# Patient Record
Sex: FEMALE | Race: WHITE
[De-identification: names, ages, dates, MRNs, and addresses within clinical notes are randomized per-mention and may not be internally consistent; named-entity substitution may affect disease eponyms.]

---

## 2020-07-29 ENCOUNTER — HOSPITAL ENCOUNTER (INPATIENT)
Dept: HOSPITAL 56 - MW.OBCHECK | Age: 30
LOS: 1 days | Discharge: HOME | End: 2020-07-30
Attending: OBSTETRICS & GYNECOLOGY | Admitting: OBSTETRICS & GYNECOLOGY
Payer: COMMERCIAL

## 2020-07-29 DIAGNOSIS — Z3A.39: ICD-10-CM

## 2020-07-29 LAB
BUN SERPL-MCNC: 10 MG/DL (ref 7–18)
CHLORIDE SERPL-SCNC: 103 MMOL/L (ref 98–107)
CO2 SERPL-SCNC: 19.7 MMOL/L (ref 21–32)
GLUCOSE SERPL-MCNC: 84 MG/DL (ref 74–106)
POTASSIUM SERPL-SCNC: 3.9 MMOL/L (ref 3.5–5.1)
SODIUM SERPL-SCNC: 137 MMOL/L (ref 136–145)

## 2020-07-29 PROCEDURE — U0002 COVID-19 LAB TEST NON-CDC: HCPCS

## 2020-07-29 NOTE — PCM.DEL
L & D Note





- General Info


Date of Service: 20


Mother's Due Date: 20





- Delivery Note


Labor: Spontaneous


Delivery Outcome: Livebirth


Infant Delivery Method: Spontaneous Vaginal Delivery-Single


Birth Presentation: Left Occiput Anterior (CEDRIC)


Nuchal Cord: None


Prep: Other


Anesthesia Type: None


Episiotomy Type: None


Laceration: None


Placenta: Intact, Spontaneous


Cord: 3 Vessels


Estimated Blood Loss: 200


Resuscitation Needed: No


: Suctioned


APGAR Score 1 min: 8


APGAR Score 5 min: 9


Delivery Comments (Free Text/Narrative):: 





Liveborn male, weight pending.





- General Info


Date of Service: 20





- Patient Data


Weight - Most Recent: 87.543 kg


Lab Results Last 24 Hours: 


                         Laboratory Results - last 24 hr











  20 Range/Units





  01:28 01:28 01:28 


 


WBC  11.30 H    (4.0-11.0)  K/uL


 


RBC  4.14 L    (4.30-5.90)  M/uL


 


Hgb  11.3 L    (12.0-16.0)  g/dL


 


Hct  35.1 L    (36.0-46.0)  %


 


MCV  84.8    (80.0-98.0)  fL


 


MCH  27.3    (27.0-32.0)  pg


 


MCHC  32.2    (31.0-37.0)  g/dL


 


RDW Std Deviation  39.4    (28.0-62.0)  fl


 


RDW Coeff of Burke  13    (11.0-15.0)  %


 


Plt Count  197    (150-400)  K/uL


 


MPV  12.00    (7.40-12.00)  fL


 


Nucleated RBC %  0.0    /100WBC


 


Nucleated RBCs #  0    K/uL


 


Sodium    137  (136-145)  mmol/L


 


Potassium    3.9  (3.5-5.1)  mmol/L


 


Chloride    103  ()  mmol/L


 


Carbon Dioxide    19.7 L  (21.0-32.0)  mmol/L


 


BUN    10  (7.0-18.0)  mg/dL


 


Creatinine    0.9  (0.6-1.0)  mg/dL


 


Est Cr Clr Drug Dosing    89.69  mL/min


 


Estimated GFR (MDRD)    > 60.0  ml/min


 


Glucose    84  ()  mg/dL


 


Calcium    8.8  (8.5-10.1)  mg/dL


 


Total Bilirubin    0.3  (0.2-1.0)  mg/dL


 


AST    26  (15-37)  IU/L


 


ALT    18  (14-63)  IU/L


 


Alkaline Phosphatase    182 H  ()  U/L


 


Total Protein    6.3 L  (6.4-8.2)  g/dL


 


Albumin    2.9 L  (3.4-5.0)  g/dL


 


Globulin    3.4  (2.6-4.0)  g/dL


 


Albumin/Globulin Ratio    0.9  (0.9-1.6)  


 


Urine Color     


 


Urine Appearance     


 


Urine pH     (5.0-8.0)  


 


Ur Specific Gravity     (1.001-1.035)  


 


Urine Protein     (NEGATIVE)  mg/dL


 


Urine Glucose (UA)     (NEGATIVE)  mg/dL


 


Urine Ketones     (NEGATIVE)  mg/dL


 


Urine Occult Blood     (NEGATIVE)  


 


Urine Nitrite     (NEGATIVE)  


 


Urine Bilirubin     (NEGATIVE)  


 


Urine Urobilinogen     (<2.0)  EU/dL


 


Ur Leukocyte Esterase     (NEGATIVE)  


 


COVID-19 (GEOVANY)     (NEGATIVE)  


 


Blood Type   O POSITIVE   


 


Antibody Screen   NEGATIVE   














  20 Range/Units





  01:35 02:00 


 


WBC    (4.0-11.0)  K/uL


 


RBC    (4.30-5.90)  M/uL


 


Hgb    (12.0-16.0)  g/dL


 


Hct    (36.0-46.0)  %


 


MCV    (80.0-98.0)  fL


 


MCH    (27.0-32.0)  pg


 


MCHC    (31.0-37.0)  g/dL


 


RDW Std Deviation    (28.0-62.0)  fl


 


RDW Coeff of Burke    (11.0-15.0)  %


 


Plt Count    (150-400)  K/uL


 


MPV    (7.40-12.00)  fL


 


Nucleated RBC %    /100WBC


 


Nucleated RBCs #    K/uL


 


Sodium    (136-145)  mmol/L


 


Potassium    (3.5-5.1)  mmol/L


 


Chloride    ()  mmol/L


 


Carbon Dioxide    (21.0-32.0)  mmol/L


 


BUN    (7.0-18.0)  mg/dL


 


Creatinine    (0.6-1.0)  mg/dL


 


Est Cr Clr Drug Dosing    mL/min


 


Estimated GFR (MDRD)    ml/min


 


Glucose    ()  mg/dL


 


Calcium    (8.5-10.1)  mg/dL


 


Total Bilirubin    (0.2-1.0)  mg/dL


 


AST    (15-37)  IU/L


 


ALT    (14-63)  IU/L


 


Alkaline Phosphatase    ()  U/L


 


Total Protein    (6.4-8.2)  g/dL


 


Albumin    (3.4-5.0)  g/dL


 


Globulin    (2.6-4.0)  g/dL


 


Albumin/Globulin Ratio    (0.9-1.6)  


 


Urine Color   YELLOW  


 


Urine Appearance   CLEAR  


 


Urine pH   5.5  (5.0-8.0)  


 


Ur Specific Gravity   <= 1.005  (1.001-1.035)  


 


Urine Protein   NEGATIVE  (NEGATIVE)  mg/dL


 


Urine Glucose (UA)   NEGATIVE  (NEGATIVE)  mg/dL


 


Urine Ketones   NEGATIVE  (NEGATIVE)  mg/dL


 


Urine Occult Blood   NEGATIVE  (NEGATIVE)  


 


Urine Nitrite   NEGATIVE  (NEGATIVE)  


 


Urine Bilirubin   NEGATIVE  (NEGATIVE)  


 


Urine Urobilinogen   0.2  (<2.0)  EU/dL


 


Ur Leukocyte Esterase   NEGATIVE  (NEGATIVE)  


 


COVID-19 (GEOVANY)  NEGATIVE   (NEGATIVE)  


 


Blood Type    


 


Antibody Screen    











Med Orders - Current: 


                               Current Medications





Acetaminophen (Tylenol Extra Strength)  500 mg PO Q4H PRN


   PRN Reason: Pain


Acetaminophen (Tylenol Extra Strength)  1,000 mg PO Q4H PRN


   PRN Reason: Pain


Benzocaine/Menthol (Dermoplast Pain Relief 20%-0.5% Spray)  78 gm TOP ASDIRECTED

PRN


   PRN Reason: Perineal Comfort Measure


Bisacodyl (Dulcolax)  10 mg RECTAL ONETIME PRN


   PRN Reason: Constipation


Docusate Sodium (Colace)  100 mg PO BID PRN


   PRN Reason: Constipation


Emollient Ointment (Lansinoh Hpa)  0 gm TOP ASDIRECTED PRN


   PRN Reason: Sore Nipples


Ibuprofen (Motrin)  400 mg PO Q4H PRN


   PRN Reason: Pain


Ibuprofen (Motrin)  800 mg PO Q6H PRN


   PRN Reason: Pain


Methylergonovine Maleate (Methergine)  0.2 mg IM ONETIME PRN


   PRN Reason: Excessive Vaginal Bleeding


Witch Hazel (Tucks)  1 pad TOP ASDIRECTED PRN


   PRN Reason: comfort care





Discontinued Medications





Butorphanol Tartrate (Stadol)  1 mg IVPUSH Q1H PRN


   PRN Reason: Pain


Carboprost Tromethamine (Hemabate Ds)  250 mcg IM ASDIRECTED PRN


   PRN Reason: Post Partum Hemorrhage


Lactated Ringer's (Ringers, Lactated)  1,000 mls @ 150 mls/hr IV ASDIRECTED NA


Oxytocin/Sodium Chloride (Oxytocin 30 Unit/500 Ml-Ns)  30 unit in 500 mls @ 500 

mls/hr IV TITRATE [facility]


   Last Admin: 20 05:36 Dose:  500 mls/hr


   Documented by: 


Tranexamic Acid 1,000 mg/ (Sodium Chloride)  110 mls @ 660 mls/hr IV ONETIME PRN


   PRN Reason: Bleeding


Lidocaine HCl (Xylocaine 1%)  50 ml INJECT ONETIME PRN


   PRN Reason: Laceration repair


Methylergonovine Maleate (Methergine)  0.2 mg IM ASDIRECTED PRN


   PRN Reason: Post Partum Hemorrhage


Misoprostol (Cytotec)  200 mcg PO ONETIME PRN


   PRN Reason: Post Partum Hemorrhage


Nalbuphine HCl (Nubain)  10 mg IVPUSH Q1H PRN


   PRN Reason: Pain (severe 7-10)


Ondansetron HCl (Zofran)  4 mg IVPUSH Q4H PRN


   PRN Reason: Nausea/Vomiting


Sodium Chloride (Saline Flush)  10 ml FLUSH ASDIRECTED PRN


   PRN Reason: Keep Vein Open


Sodium Chloride (Saline Flush)  2.5 ml FLUSH ASDIRECTED PRN


   PRN Reason: Keep Vein Open


Sodium Chloride (Normal Saline)  10 ml IV ASDIRECTED PRN


   PRN Reason: IV Use


Sterile Water (Sterile Water For Irrigation)  1,000 ml IRR ASDIRECTED PRN


   PRN Reason: delivery











- Problem List & Annotations


(1) Vaginal delivery


SNOMED Code(s): 055332015


   Code(s): O80 - ENCOUNTER FOR FULL-TERM UNCOMPLICATED DELIVERY   Status: Acute

  Current Visit: No   





- Problem List Review


Problem List Initiated/Reviewed/Updated: Yes





- My Orders


Last 24 Hours: 


My Active Orders





20 01:28


RPR (SYPHILIS SERO) W/ RFLX [REF] Routine 





20 05:20


BLOOD GAS ARTERIAL UMBILICAL [BG] Urgent 


BLOOD GAS VENOUS UMBILICAL [BG] Urgent 





20 05:35


Acetaminophen [Tylenol Extra Strength]   1,000 mg PO Q4H PRN 


Acetaminophen [Tylenol Extra Strength]   500 mg PO Q4H PRN 


Benzocaine/Menthol [Dermoplast Pain Relief 20%-0.5% Spray]   78 gm TOP 

ASDIRECTED PRN 


Docusate Sodium [Colace]   100 mg PO BID PRN 


Ibuprofen [Motrin]   400 mg PO Q4H PRN 


Ibuprofen [Motrin]   800 mg PO Q6H PRN 


Lanolin [Lansinoh HPA]   See Dose Instructions  TOP ASDIRECTED PRN 


Methylergonovine [Methergine]   0.2 mg IM ONETIME PRN 


bisacodyL [Dulcolax]   10 mg RECTAL ONETIME PRN 


witch hazeL [Tucks]   1 pad TOP ASDIRECTED PRN 


Resuscitation Status Routine 





20 05:36


Patient Status [ADT] Routine 


May Shower [RC] ASDIRECTED 


Up ad Sol [RC] ASDIRECTED 


Vital Signs [RC] PER UNIT ROUTINE 


Assess Lochia [WOMSER] Per Unit Routine 


Assess Uterine Involution [WOMSER] Per Unit Routine 


Perineal Care [OM.PC] Per Unit Routine 


Peripheral IV Discontinue [OM.PC] Routine 





20 Breakfast


Regular Diet [DIET] 





20 05:11


HEMOGLOBIN/HEMATOCRIT,HH [HEME] Timed

## 2020-07-30 VITALS — HEART RATE: 108 BPM | SYSTOLIC BLOOD PRESSURE: 130 MMHG | DIASTOLIC BLOOD PRESSURE: 88 MMHG

## 2020-07-30 NOTE — PCM.PNPP
- General Info


Date of Service: 20


Functional Status: Reports: Pain Controlled, Tolerating Diet, Ambulating, 

Urinating





- Review of Systems


General: Reports: No Symptoms


HEENT: Reports: No Symptoms


Pulmonary: Reports: No Symptoms


Cardiovascular: Reports: No Symptoms


Gastrointestinal: Reports: No Symptoms


Genitourinary: Reports: No Symptoms


Musculoskeletal: Reports: No Symptoms


Skin: Reports: No Symptoms


Neurological: Reports: No Symptoms


Psychiatric: Reports: No Symptoms





- Patient Data


Vital Signs - Most Recent: 


                                Last Vital Signs











Temp  36.6 C   20 05:00


 


Pulse  68   20 05:00


 


Resp  17   20 05:00


 


BP  116/63   20 05:00


 


Pulse Ox  97   20 05:00











Weight - Most Recent: 87.543 kg


Lab Results - Last 24 Hours: 


                         Laboratory Results - last 24 hr











  20 Range/Units





  06:03 


 


Hgb  10.1 L  (12.0-16.0)  g/dL


 


Hct  32.1 L  (36.0-46.0)  %











Med Orders - Current: 


                               Current Medications





Acetaminophen (Tylenol Extra Strength)  500 mg PO Q4H PRN


   PRN Reason: Pain


Acetaminophen (Tylenol Extra Strength)  1,000 mg PO Q4H PRN


   PRN Reason: Pain


Benzocaine/Menthol (Dermoplast Pain Relief 20%-0.5% Spray)  78 gm TOP ASDIRECTED

PRN


   PRN Reason: Perineal Comfort Measure


Bisacodyl (Dulcolax)  10 mg RECTAL ONETIME PRN


   PRN Reason: Constipation


Docusate Sodium (Colace)  100 mg PO BID PRN


   PRN Reason: Constipation


   Last Admin: 20 00:32 Dose:  100 mg


   Documented by: 


Emollient Ointment (Lansinoh Hpa)  0 gm TOP ASDIRECTED PRN


   PRN Reason: Sore Nipples


Ibuprofen (Motrin)  400 mg PO Q4H PRN


   PRN Reason: Pain


Ibuprofen (Motrin)  800 mg PO Q6H PRN


   PRN Reason: Pain


   Last Admin: 20 00:32 Dose:  800 mg


   Documented by: 


Methylergonovine Maleate (Methergine)  0.2 mg IM ONETIME PRN


   PRN Reason: Excessive Vaginal Bleeding


Witch Hazel (Tucks)  1 pad TOP ASDIRECTED PRN


   PRN Reason: comfort care





Discontinued Medications





Butorphanol Tartrate (Stadol)  1 mg IVPUSH Q1H PRN


   PRN Reason: Pain


Carboprost Tromethamine (Hemabate Ds)  250 mcg IM ASDIRECTED PRN


   PRN Reason: Post Partum Hemorrhage


Lactated Ringer's (Ringers, Lactated)  1,000 mls @ 150 mls/hr IV ASDIRECTED [facility]


Oxytocin/Sodium Chloride (Oxytocin 30 Unit/500 Ml-Ns)  30 unit in 500 mls @ 500 

mls/hr IV TITRATE [facility]


   Last Admin: 20 05:36 Dose:  500 mls/hr


   Documented by: 


Tranexamic Acid 1,000 mg/ (Sodium Chloride)  110 mls @ 660 mls/hr IV ONETIME PRN


   PRN Reason: Bleeding


Lidocaine HCl (Xylocaine 1%)  50 ml INJECT ONETIME PRN


   PRN Reason: Laceration repair


Methylergonovine Maleate (Methergine)  0.2 mg IM ASDIRECTED PRN


   PRN Reason: Post Partum Hemorrhage


Misoprostol (Cytotec)  200 mcg PO ONETIME PRN


   PRN Reason: Post Partum Hemorrhage


Nalbuphine HCl (Nubain)  10 mg IVPUSH Q1H PRN


   PRN Reason: Pain (severe 7-10)


Ondansetron HCl (Zofran)  4 mg IVPUSH Q4H PRN


   PRN Reason: Nausea/Vomiting


Sodium Chloride (Saline Flush)  10 ml FLUSH ASDIRECTED PRN


   PRN Reason: Keep Vein Open


Sodium Chloride (Saline Flush)  2.5 ml FLUSH ASDIRECTED PRN


   PRN Reason: Keep Vein Open


Sodium Chloride (Normal Saline)  10 ml IV ASDIRECTED PRN


   PRN Reason: IV Use


Sterile Water (Sterile Water For Irrigation)  1,000 ml IRR ASDIRECTED PRN


   PRN Reason: delivery











- Infant Interaction


Infant Disposition, Postpartum:  in Room with Family


Infant Interaction: Holding Infant


Infant Feeding:  Infant; Nursed Well


Support Person: 





- Postpartum Recovery Exam


Fundal Tone: Firm


Fundal Level: 2 Fingerbreadths Below Umbilicus


Fundal Placement: Midline


Lochia Amount: Scant


Lochia Color: Rubra/Red


Bladder Status: Voiding


Urinary Elimination: Voided





- Exam


General: Alert, Oriented


Neck: Supple


Lungs: Normal Respiratory Effort


GI/Abdominal Exam: No Distention


Skin: Warm, Dry, Intact


Neurological: No New Focal Deficit


Psy/Mental Status: Alert, Normal Affect, Normal Mood





- Problem List & Annotations


(1) Vaginal delivery


SNOMED Code(s): 697409332


   Code(s): O80 - ENCOUNTER FOR FULL-TERM UNCOMPLICATED DELIVERY   Status: Acute

  Current Visit: No   





- Problem List Review


Problem List Initiated/Reviewed/Updated: Yes





- Assessment


Assessment:: 





28yo s/p , PPD#1





- Plan


Plan:: 





Patient desires discharge home today, reviewed discharge instructions.